# Patient Record
Sex: FEMALE | Race: WHITE
[De-identification: names, ages, dates, MRNs, and addresses within clinical notes are randomized per-mention and may not be internally consistent; named-entity substitution may affect disease eponyms.]

---

## 2020-01-08 ENCOUNTER — HOSPITAL ENCOUNTER (INPATIENT)
Dept: HOSPITAL 92 - L&D/OP | Age: 40
LOS: 2 days | Discharge: HOME | End: 2020-01-10
Attending: FAMILY MEDICINE | Admitting: FAMILY MEDICINE
Payer: COMMERCIAL

## 2020-01-08 VITALS — BODY MASS INDEX: 28.1 KG/M2

## 2020-01-08 DIAGNOSIS — D69.6: ICD-10-CM

## 2020-01-08 DIAGNOSIS — Z3A.40: ICD-10-CM

## 2020-01-08 LAB
HBSAG INDEX: 0.33 S/CO (ref 0–0.99)
HGB BLD-MCNC: 12.6 G/DL (ref 12–16)
MCH RBC QN AUTO: 30 PG (ref 27–31)
MCV RBC AUTO: 88.9 FL (ref 78–98)
PLATELET # BLD AUTO: 61 THOU/UL (ref 130–400)
RBC # BLD AUTO: 4.19 MILL/UL (ref 4.2–5.4)
SYPHILIS ANTIBODY INDEX: 0.04 S/CO
WBC # BLD AUTO: 4.9 THOU/UL (ref 4.8–10.8)

## 2020-01-08 PROCEDURE — 36415 COLL VENOUS BLD VENIPUNCTURE: CPT

## 2020-01-08 PROCEDURE — 87340 HEPATITIS B SURFACE AG IA: CPT

## 2020-01-08 PROCEDURE — 85025 COMPLETE CBC W/AUTO DIFF WBC: CPT

## 2020-01-08 PROCEDURE — 86900 BLOOD TYPING SEROLOGIC ABO: CPT

## 2020-01-08 PROCEDURE — 86850 RBC ANTIBODY SCREEN: CPT

## 2020-01-08 PROCEDURE — 36416 COLLJ CAPILLARY BLOOD SPEC: CPT

## 2020-01-08 PROCEDURE — 85027 COMPLETE CBC AUTOMATED: CPT

## 2020-01-08 PROCEDURE — 86780 TREPONEMA PALLIDUM: CPT

## 2020-01-08 PROCEDURE — 86901 BLOOD TYPING SEROLOGIC RH(D): CPT

## 2020-01-08 RX ADMIN — DOCUSATE CALCIUM SCH MG: 240 CAPSULE, LIQUID FILLED ORAL at 20:47

## 2020-01-08 NOTE — PDOC.OPDEL
OB Operative/Delivery Note


Delivery Dr/Surgeon: Shawn


Pre-Delivery Diagnosis: active labor


Procedure/Post Delivery Dx: spontaneous vaginal delivery (Head OA, no nuchal 

cord, shoulders and body easily followed. Cord clamped and cut. Cord blood to 

the lab. Baby to mother's abdomen.)


Weeks gestation: 40


Anesthesia: none





- Findings


  ** A


Sex: female


Apgar - 1 min: 9


Apgar - 5 min: 9





- Additional Findings/Plan


Placenta delivered: spontaneous (Intact placenta, 3 vessel cord. Uterus firm 

after delivery.)


Repaired Obstetrical Laceration: none


Estimated blood loss: 250


Post delivery plan: routine recovery

## 2020-01-08 NOTE — PDOC.LDHP
Labor and Delivery H&P


Chief complaint: contractions


HPI: 





CTX started 4 hours ago. Not really close together but getting more intense.  

Prenatal hx significant for GDMA1 and thrombocytopenia of pregnancy without 

bleeding issues.


Current gestational age (weeks): 40


Due date: 20


Dating criteria: last menstrual period, first trimester ultrasound


Grav: 9


Para: 7


Current pregnancy complications: gestational diabetes, other (Thrombocytopenia 

of pregnancy)


Abnormal US findings: No


Current medications: pre-[place] vitamins


Allergies/Adverse Reactions: 


 Allergies











Allergy/AdvReac Type Severity Reaction Status Date / Time


 


No Known Allergies Allergy   Verified 17 16:01











Social history: none





- Physical Exam


Vital signs reviewed and normal: yes


General: NAD, breathing through contractions


Heart: RRR


Lungs: CTAB


Abdomen: gravid


Extremeties: no edema


FHT: category 1, variability present





- Vaginal Exam


cm dilated: 8


Effacement: 75%


Station: 1+





- OB Labs


Blood type: O


RH: positive


Antibody Screen: negative


HIV: negative


RPR: negative


HEPSAg: negative


1 hour GCT: positive


GBS: negative


Urine drug screen: not done


Rubella: immune





- Assessment


L&D Assessment: term patient in labor





- Plan


Plan: admit to L&D, informed consent obtained (Will repeat CBC after delivery 

for Platelets. H/O no pain meds with deliveries. Anticipate . Patient wants 

low intervention. Check accu-check for GDMA1.)

## 2020-01-09 LAB
BASOPHILS # BLD AUTO: 0 THOU/UL (ref 0–0.2)
BASOPHILS NFR BLD AUTO: 0.2 % (ref 0–1)
EOSINOPHIL # BLD AUTO: 0.1 THOU/UL (ref 0–0.7)
EOSINOPHIL NFR BLD AUTO: 1.3 % (ref 0–10)
HGB BLD-MCNC: 10.3 G/DL (ref 12–16)
LYMPHOCYTES # BLD: 2.1 THOU/UL (ref 1.2–3.4)
LYMPHOCYTES NFR BLD AUTO: 33 % (ref 21–51)
MCH RBC QN AUTO: 30.5 PG (ref 27–31)
MCV RBC AUTO: 90.7 FL (ref 78–98)
MONOCYTES # BLD AUTO: 0.5 THOU/UL (ref 0.11–0.59)
MONOCYTES NFR BLD AUTO: 7 % (ref 0–10)
NEUTROPHILS # BLD AUTO: 3.8 THOU/UL (ref 1.4–6.5)
NEUTROPHILS NFR BLD AUTO: 58.5 % (ref 42–75)
PLATELET # BLD AUTO: 56 THOU/UL (ref 130–400)
RBC # BLD AUTO: 3.39 MILL/UL (ref 4.2–5.4)
WBC # BLD AUTO: 6.5 THOU/UL (ref 4.8–10.8)

## 2020-01-09 RX ADMIN — DOCUSATE CALCIUM SCH: 240 CAPSULE, LIQUID FILLED ORAL at 21:31

## 2020-01-09 RX ADMIN — DOCUSATE CALCIUM SCH MG: 240 CAPSULE, LIQUID FILLED ORAL at 08:16

## 2020-01-10 VITALS — SYSTOLIC BLOOD PRESSURE: 101 MMHG | DIASTOLIC BLOOD PRESSURE: 59 MMHG | TEMPERATURE: 97.7 F

## 2020-01-10 RX ADMIN — DOCUSATE CALCIUM SCH MG: 240 CAPSULE, LIQUID FILLED ORAL at 08:39

## 2020-01-10 NOTE — PDOC.PP
Post Partum Progress Note


Post Partum Day #: 2


Subjective: 





Doing well, no complaints. 


PO intake tolerated: yes


Flatus: yes


Ambulation: yes


 Vital Signs (12 hours)











  Temp Pulse Resp BP Pulse Ox


 


 01/10/20 07:51  97.7 F  59 L  20  101/59 L  96


 


 01/10/20 04:58  98.5 F  57 L  18  110/68  99








 Weight











Weight                         164 lb

















- Physical Examination


General: NAD


Cardiovascular: no m/r/g, RRR


Respiratory: clear to auscultation bilaterally, non-labored breathing


Abdominal: + bowel sounds, lochia, no distention, appropriately TTP


Result Diagrams: 


 01/09/20 05:36





Additional Labs: 


 Post Partum Labs











Blood Type  O POSITIVE   01/08/20  14:28    


 


Hep Bs Antigen  Non-Reactive S/CO (NonReactive)   01/08/20  14:28    











(1) Thrombocytopenia complicating pregnancy


Code(s): O99.119 - OTH DIS OF BLD/BLD-FORM ORG/IMMUN MECHNSM COMP PREG,UNSP TRI

; D69.6 - THROMBOCYTOPENIA, UNSPECIFIED   Status: Acute   





(2) Vaginal delivery


Code(s): O80 - ENCOUNTER FOR FULL-TERM UNCOMPLICATED DELIVERY   Status: Acute   





- Assessment/Plan





Routine care


D/C home

## 2020-01-10 NOTE — PDOC.PP
Post Partum Progress Note


Post Partum Day #: 1


Subjective: 





Doing well. Breastfeeding going great. No concerns.


PO intake tolerated: yes


Flatus: yes


Ambulation: yes


 Vital Signs (12 hours)











  Temp Pulse Resp BP Pulse Ox


 


 01/10/20 07:51  97.7 F  59 L  20  101/59 L  96


 


 01/10/20 04:58  98.5 F  57 L  18  110/68  99








 Weight











Weight                         164 lb

















- Physical Examination


General: NAD


Cardiovascular: no m/r/g, RRR


Respiratory: clear to auscultation bilaterally, non-labored breathing


Abdominal: + bowel sounds, lochia, no distention, appropriately TTP


Result Diagrams: 


 01/09/20 05:36





Additional Labs: 


 Post Partum Labs











Blood Type  O POSITIVE   01/08/20  14:28    


 


Hep Bs Antigen  Non-Reactive S/CO (NonReactive)   01/08/20  14:28    











(1) Thrombocytopenia complicating pregnancy


Code(s): O99.119 - OTH DIS OF BLD/BLD-FORM ORG/IMMUN MECHNSM COMP PREG,UNSP TRI

; D69.6 - THROMBOCYTOPENIA, UNSPECIFIED   Status: Acute   





(2) Vaginal delivery


Code(s): O80 - ENCOUNTER FOR FULL-TERM UNCOMPLICATED DELIVERY   Status: Acute   





- Assessment/Plan





Routine PP care


Home tomorrow


Plt fell a little more but no s/s of bleeding


Lochia normal